# Patient Record
Sex: FEMALE | Race: WHITE | URBAN - METROPOLITAN AREA
[De-identification: names, ages, dates, MRNs, and addresses within clinical notes are randomized per-mention and may not be internally consistent; named-entity substitution may affect disease eponyms.]

---

## 2019-03-05 PROBLEM — 422587007 NAUSEA: Status: ACTIVE | Noted: 2019-03-05

## 2019-03-05 PROBLEM — 266435005 GASTRO-ESOPHAGEAL REFLUX DISEASE WITHOUT ESOPHAGITIS: Status: ACTIVE | Noted: 2019-03-05

## 2020-07-07 ENCOUNTER — OFFICE VISIT (OUTPATIENT)
Dept: URBAN - METROPOLITAN AREA SURGERY CENTER 13 | Facility: SURGERY CENTER | Age: 60
End: 2020-07-07
Payer: COMMERCIAL

## 2020-07-07 DIAGNOSIS — K92.1 BLACK STOOL: ICD-10-CM

## 2020-07-07 DIAGNOSIS — K62.89 ANAL BURNING: ICD-10-CM

## 2020-07-07 PROCEDURE — 45380 COLONOSCOPY AND BIOPSY: CPT | Performed by: INTERNAL MEDICINE

## 2020-07-07 PROCEDURE — G8907 PT DOC NO EVENTS ON DISCHARG: HCPCS | Performed by: INTERNAL MEDICINE

## 2020-07-07 PROCEDURE — G9937 DIG OR SURV COLSCO: HCPCS | Performed by: INTERNAL MEDICINE

## 2020-07-07 RX ORDER — ONDANSETRON 8 MG/1
1 TABLET ON THE TONGUE AND ALLOW TO DISSOLVE TABLET, ORALLY DISINTEGRATING ORAL ONCE A DAY
Qty: 10 TABLET | Refills: 1 | OUTPATIENT
Start: 2020-07-07

## 2020-07-07 RX ORDER — TRAMADOL HYDROCHLORIDE 50 MG/1
TAKE 1 TABLET (50 MG) BY ORAL ROUTE EVERY 6 HOURS AS NEEDED TABLET ORAL
Qty: 0 | Refills: 0 | Status: ACTIVE | COMMUNITY
Start: 1900-01-01 | End: 1900-01-01

## 2020-07-07 RX ORDER — ACETAMINOPHEN ER 650 MG TABLET,EXTENDED RELEASE 650 MG
TABLET, EXTENDED RELEASE ORAL
Qty: 0 | Refills: 0 | Status: ACTIVE | COMMUNITY
Start: 1900-01-01 | End: 1900-01-01

## 2020-07-07 RX ORDER — DEXLANSOPRAZOLE 30 MG/1
TAKE 1 CAPSULE (30 MG) ;ONCE A DAY CAPSULE, DELAYED RELEASE ORAL 1
Qty: 90 | Refills: 3 | Status: ACTIVE | COMMUNITY
Start: 2020-05-06 | End: 2021-05-01

## 2020-07-07 RX ORDER — PROGESTERONE 50 MG/ML
INJECTION, SOLUTION INTRAMUSCULAR
Qty: 0 | Refills: 0 | Status: ACTIVE | COMMUNITY
Start: 1900-01-01 | End: 1900-01-01

## 2021-07-20 ENCOUNTER — LAB OUTSIDE AN ENCOUNTER (OUTPATIENT)
Dept: URBAN - METROPOLITAN AREA TELEHEALTH 2 | Facility: TELEHEALTH | Age: 61
End: 2021-07-20

## 2021-07-20 ENCOUNTER — TELEPHONE ENCOUNTER (OUTPATIENT)
Dept: URBAN - METROPOLITAN AREA CLINIC 92 | Facility: CLINIC | Age: 61
End: 2021-07-20

## 2021-07-20 ENCOUNTER — OFFICE VISIT (OUTPATIENT)
Dept: URBAN - METROPOLITAN AREA TELEHEALTH 2 | Facility: TELEHEALTH | Age: 61
End: 2021-07-20
Payer: COMMERCIAL

## 2021-07-20 DIAGNOSIS — R09.89 GLOBUS PHARYNGEUS: ICD-10-CM

## 2021-07-20 DIAGNOSIS — R05 COUGH: ICD-10-CM

## 2021-07-20 DIAGNOSIS — K21.9 GASTROESOPHAGEAL REFLUX DISEASE, UNSPECIFIED WHETHER ESOPHAGITIS PRESENT: ICD-10-CM

## 2021-07-20 PROCEDURE — 99214 OFFICE O/P EST MOD 30 MIN: CPT | Performed by: INTERNAL MEDICINE

## 2021-07-20 RX ORDER — PROGESTERONE 50 MG/ML
INJECTION, SOLUTION INTRAMUSCULAR
Qty: 0 | Refills: 0 | Status: ACTIVE | COMMUNITY
Start: 1900-01-01

## 2021-07-20 RX ORDER — ONDANSETRON 8 MG/1
1 TABLET ON THE TONGUE AND ALLOW TO DISSOLVE TABLET, ORALLY DISINTEGRATING ORAL ONCE A DAY
Qty: 10 TABLET | Refills: 1 | Status: ACTIVE | COMMUNITY
Start: 2020-07-07

## 2021-07-20 RX ORDER — TRAMADOL HYDROCHLORIDE 50 MG/1
TAKE 1 TABLET (50 MG) BY ORAL ROUTE EVERY 6 HOURS AS NEEDED TABLET ORAL
Qty: 0 | Refills: 0 | Status: ACTIVE | COMMUNITY
Start: 1900-01-01

## 2021-07-20 RX ORDER — DEXLANSOPRAZOLE 60 MG/1
TAKE 1 CAPSULE(60 MG) BY MOUTH EVERY DAY CAPSULE, DELAYED RELEASE ORAL ONCE A DAY
Qty: 90 | Refills: 0 | OUTPATIENT
Start: 2021-07-20

## 2021-07-20 RX ORDER — ACETAMINOPHEN ER 650 MG TABLET,EXTENDED RELEASE 650 MG
TABLET, EXTENDED RELEASE ORAL
Qty: 0 | Refills: 0 | Status: ACTIVE | COMMUNITY
Start: 1900-01-01

## 2021-07-20 NOTE — HPI-TODAY'S VISIT:
Marsha is a 61-year-old  female seen today over telehealth for new complaints of having acid reflux for the past 9 to 10 years.  Patient reports having history of reflux for several years in the past she had been on Dexilant.  She tried to to take last dose on Dexilant which is 30 mg however she patient developed a reflux and hence she went back to 60 mg.  Patient reports that she is having reflux and cough symptoms she also have cough at night and also when she lays down and she denies chronic sinus issues.  She is interested in having a Lynx procedure procedure for hiatal hernia.  She does not recall having any colonoscopy in the past.  She denies any dysphagia.  She has had prior colonoscopies which were unremarkable most recent one was July 2020.

## 2021-07-22 ENCOUNTER — OFFICE VISIT (OUTPATIENT)
Dept: URBAN - METROPOLITAN AREA SURGERY CENTER 13 | Facility: SURGERY CENTER | Age: 61
End: 2021-07-22
Payer: COMMERCIAL

## 2021-07-22 DIAGNOSIS — K29.60 ADENOPAPILLOMATOSIS GASTRICA: ICD-10-CM

## 2021-07-22 DIAGNOSIS — R12 BURNING REFLUX: ICD-10-CM

## 2021-07-22 DIAGNOSIS — K20.80 ESOPHAGITIS DISSECANS SUPERFICIALIS: ICD-10-CM

## 2021-07-22 DIAGNOSIS — R05 CHRONIC COUGH: ICD-10-CM

## 2021-07-22 PROCEDURE — G8907 PT DOC NO EVENTS ON DISCHARG: HCPCS | Performed by: INTERNAL MEDICINE

## 2021-07-22 PROCEDURE — 43239 EGD BIOPSY SINGLE/MULTIPLE: CPT | Performed by: INTERNAL MEDICINE

## 2021-08-19 ENCOUNTER — OFFICE VISIT (OUTPATIENT)
Dept: URBAN - METROPOLITAN AREA MEDICAL CENTER 28 | Facility: MEDICAL CENTER | Age: 61
End: 2021-08-19

## 2022-04-30 ENCOUNTER — TELEPHONE ENCOUNTER (OUTPATIENT)
Dept: URBAN - METROPOLITAN AREA CLINIC 121 | Facility: CLINIC | Age: 62
End: 2022-04-30

## 2022-04-30 RX ORDER — DEXLANSOPRAZOLE 60 MG/1
TAKE 1 CAPSULE EVERY       MORNING CAPSULE, DELAYED RELEASE ORAL
OUTPATIENT
Start: 2020-04-23

## 2022-04-30 RX ORDER — DEXLANSOPRAZOLE 60 MG/1
TAKE 1 CAPSULE EVERY       MORNING CAPSULE, DELAYED RELEASE ORAL
OUTPATIENT
Start: 2020-04-23 | End: 2021-09-23

## 2022-04-30 RX ORDER — DEXLANSOPRAZOLE 60 MG/1
TAKE 1 CAPSULE PO QAM CAPSULE, DELAYED RELEASE ORAL
OUTPATIENT
Start: 2019-03-05

## 2022-04-30 RX ORDER — DEXLANSOPRAZOLE 60 MG/1
TAKE 1 CAPSULE PO QAM CAPSULE, DELAYED RELEASE ORAL
OUTPATIENT
Start: 2019-03-05 | End: 2021-09-23

## 2022-05-01 ENCOUNTER — TELEPHONE ENCOUNTER (OUTPATIENT)
Dept: URBAN - METROPOLITAN AREA CLINIC 121 | Facility: CLINIC | Age: 62
End: 2022-05-01

## 2022-05-01 RX ORDER — CREAM BASE NO.31
CREAM (GRAM) MISCELLANEOUS
Status: ACTIVE | COMMUNITY
Start: 2019-03-05

## 2022-05-01 RX ORDER — DEXLANSOPRAZOLE 60 MG/1
TAKE 1 CAPSULE BY MOUTH ONCE A DAY IN THE MORNING CAPSULE, DELAYED RELEASE ORAL
Status: ACTIVE | COMMUNITY
Start: 2021-09-23

## 2022-05-01 RX ORDER — ONDANSETRON 4 MG/1
TAKE 1 TABLET SL Q4-6H PRN N/V TABLET, ORALLY DISINTEGRATING ORAL
Status: ACTIVE | COMMUNITY
Start: 2019-03-05

## 2022-05-01 RX ORDER — ESCITALOPRAM OXALATE 5 MG/1
TABLET, FILM COATED ORAL
Status: ACTIVE | COMMUNITY
Start: 2019-03-05

## 2022-08-12 ENCOUNTER — DASHBOARD ENCOUNTERS (OUTPATIENT)
Age: 62
End: 2022-08-12

## 2022-08-12 ENCOUNTER — OFFICE VISIT (OUTPATIENT)
Dept: URBAN - METROPOLITAN AREA CLINIC 115 | Facility: CLINIC | Age: 62
End: 2022-08-12
Payer: COMMERCIAL

## 2022-08-12 ENCOUNTER — LAB OUTSIDE AN ENCOUNTER (OUTPATIENT)
Dept: URBAN - METROPOLITAN AREA CLINIC 115 | Facility: CLINIC | Age: 62
End: 2022-08-12

## 2022-08-12 VITALS
HEIGHT: 62 IN | HEART RATE: 73 BPM | DIASTOLIC BLOOD PRESSURE: 74 MMHG | SYSTOLIC BLOOD PRESSURE: 116 MMHG | BODY MASS INDEX: 22.63 KG/M2 | WEIGHT: 123 LBS | TEMPERATURE: 98 F

## 2022-08-12 DIAGNOSIS — R09.89 GLOBUS PHARYNGEUS: ICD-10-CM

## 2022-08-12 DIAGNOSIS — K21.9 GASTROESOPHAGEAL REFLUX DISEASE, UNSPECIFIED WHETHER ESOPHAGITIS PRESENT: ICD-10-CM

## 2022-08-12 DIAGNOSIS — R05.8 OTHER COUGH: ICD-10-CM

## 2022-08-12 PROBLEM — 49727002: Status: ACTIVE | Noted: 2021-07-20

## 2022-08-12 PROCEDURE — 99214 OFFICE O/P EST MOD 30 MIN: CPT | Performed by: INTERNAL MEDICINE

## 2022-08-12 RX ORDER — DICYCLOMINE HYDROCHLORIDE 10 MG/1
TAKE 1 CAPSULE BY MOUTH THREE TIMES A DAY CAPSULE ORAL
Qty: 60 CAPSULE | Refills: 1 | OUTPATIENT
Start: 2022-08-12 | End: 2022-10-11

## 2022-08-12 RX ORDER — DEXLANSOPRAZOLE 60 MG/1
TAKE 1 CAPSULE(60 MG) BY MOUTH EVERY DAY CAPSULE, DELAYED RELEASE ORAL ONCE A DAY
Qty: 90 | Refills: 0 | Status: ACTIVE | COMMUNITY
Start: 2021-07-20

## 2022-08-12 RX ORDER — DEXLANSOPRAZOLE 60 MG/1
TAKE 1 CAPSULE(60 MG) BY MOUTH EVERY DAY CAPSULE, DELAYED RELEASE ORAL ONCE A DAY
Qty: 90 | Refills: 0 | OUTPATIENT

## 2022-08-12 RX ORDER — PROGESTERONE 50 MG/ML
INJECTION, SOLUTION INTRAMUSCULAR
Qty: 0 | Refills: 0 | Status: ACTIVE | COMMUNITY
Start: 1900-01-01

## 2022-08-12 RX ORDER — ONDANSETRON 8 MG/1
1 TABLET ON THE TONGUE AND ALLOW TO DISSOLVE TABLET, ORALLY DISINTEGRATING ORAL ONCE A DAY
Qty: 10 TABLET | Refills: 1 | Status: ACTIVE | COMMUNITY
Start: 2020-07-07

## 2022-08-12 RX ORDER — TRAMADOL HYDROCHLORIDE 50 MG/1
TAKE 1 TABLET (50 MG) BY ORAL ROUTE EVERY 6 HOURS AS NEEDED TABLET ORAL
Qty: 0 | Refills: 0 | Status: ON HOLD | COMMUNITY
Start: 1900-01-01

## 2022-08-12 RX ORDER — ESCITALOPRAM OXALATE 5 MG/1
TABLET, FILM COATED ORAL
Status: ACTIVE | COMMUNITY
Start: 2019-03-05

## 2022-08-12 RX ORDER — ACETAMINOPHEN ER 650 MG TABLET,EXTENDED RELEASE 650 MG
TABLET, EXTENDED RELEASE ORAL
Qty: 0 | Refills: 0 | Status: ON HOLD | COMMUNITY
Start: 1900-01-01

## 2022-08-12 RX ORDER — DEXLANSOPRAZOLE 60 MG/1
TAKE 1 CAPSULE BY MOUTH ONCE A DAY IN THE MORNING CAPSULE, DELAYED RELEASE ORAL
Status: ON HOLD | COMMUNITY
Start: 2021-09-23

## 2022-08-12 RX ORDER — ONDANSETRON 4 MG/1
TAKE 1 TABLET SL Q4-6H PRN N/V TABLET, ORALLY DISINTEGRATING ORAL
Status: ACTIVE | COMMUNITY
Start: 2019-03-05

## 2022-08-12 RX ORDER — CREAM BASE NO.31
CREAM (GRAM) MISCELLANEOUS
Status: ON HOLD | COMMUNITY
Start: 2019-03-05

## 2022-08-18 ENCOUNTER — OFFICE VISIT (OUTPATIENT)
Dept: URBAN - METROPOLITAN AREA CLINIC 114 | Facility: CLINIC | Age: 62
End: 2022-08-18
Payer: COMMERCIAL

## 2022-08-18 DIAGNOSIS — R10.13 DYSPEPSIA: ICD-10-CM

## 2022-08-18 PROCEDURE — 76705 ECHO EXAM OF ABDOMEN: CPT | Performed by: INTERNAL MEDICINE

## 2022-08-18 RX ORDER — ESCITALOPRAM OXALATE 5 MG/1
TABLET, FILM COATED ORAL
Status: ACTIVE | COMMUNITY
Start: 2019-03-05

## 2022-08-18 RX ORDER — ACETAMINOPHEN ER 650 MG TABLET,EXTENDED RELEASE 650 MG
TABLET, EXTENDED RELEASE ORAL
Qty: 0 | Refills: 0 | Status: ON HOLD | COMMUNITY
Start: 1900-01-01

## 2022-08-18 RX ORDER — ONDANSETRON 4 MG/1
TAKE 1 TABLET SL Q4-6H PRN N/V TABLET, ORALLY DISINTEGRATING ORAL
Status: ACTIVE | COMMUNITY
Start: 2019-03-05

## 2022-08-18 RX ORDER — TRAMADOL HYDROCHLORIDE 50 MG/1
TAKE 1 TABLET (50 MG) BY ORAL ROUTE EVERY 6 HOURS AS NEEDED TABLET ORAL
Qty: 0 | Refills: 0 | Status: ON HOLD | COMMUNITY
Start: 1900-01-01

## 2022-08-18 RX ORDER — PROGESTERONE 50 MG/ML
INJECTION, SOLUTION INTRAMUSCULAR
Qty: 0 | Refills: 0 | Status: ACTIVE | COMMUNITY
Start: 1900-01-01

## 2022-08-18 RX ORDER — DEXLANSOPRAZOLE 60 MG/1
TAKE 1 CAPSULE(60 MG) BY MOUTH EVERY DAY CAPSULE, DELAYED RELEASE ORAL ONCE A DAY
Qty: 90 | Refills: 0 | Status: ACTIVE | COMMUNITY

## 2022-08-18 RX ORDER — DICYCLOMINE HYDROCHLORIDE 10 MG/1
TAKE 1 CAPSULE BY MOUTH THREE TIMES A DAY CAPSULE ORAL
Qty: 60 CAPSULE | Refills: 1 | Status: ACTIVE | COMMUNITY
Start: 2022-08-12 | End: 2022-10-11

## 2022-08-18 RX ORDER — ONDANSETRON 8 MG/1
1 TABLET ON THE TONGUE AND ALLOW TO DISSOLVE TABLET, ORALLY DISINTEGRATING ORAL ONCE A DAY
Qty: 10 TABLET | Refills: 1 | Status: ACTIVE | COMMUNITY
Start: 2020-07-07

## 2022-08-18 RX ORDER — CREAM BASE NO.31
CREAM (GRAM) MISCELLANEOUS
Status: ON HOLD | COMMUNITY
Start: 2019-03-05

## 2022-08-18 RX ORDER — DEXLANSOPRAZOLE 60 MG/1
TAKE 1 CAPSULE BY MOUTH ONCE A DAY IN THE MORNING CAPSULE, DELAYED RELEASE ORAL
Status: ON HOLD | COMMUNITY
Start: 2021-09-23

## 2022-08-24 PROBLEM — 267103008: Status: ACTIVE | Noted: 2021-07-20

## 2022-08-24 PROBLEM — 235595009 GASTROESOPHAGEAL REFLUX DISEASE: Status: ACTIVE | Noted: 2022-08-12
